# Patient Record
(demographics unavailable — no encounter records)

---

## 2025-02-28 NOTE — HEALTH RISK ASSESSMENT
[Yes] : Yes [No] : In the past 12 months have you used drugs other than those required for medical reasons? No [No falls in past year] : Patient reported no falls in the past year [0] : 2) Feeling down, depressed, or hopeless: Not at all (0) [de-identified] : no [de-identified] : no [de-identified] : walking [de-identified] : okay [de-identified] : no

## 2025-02-28 NOTE — HISTORY OF PRESENT ILLNESS
[FreeTextEntry1] : HTN [de-identified] : The patient is a 68 year old F who presents to the office for routine follow up of HTN BP has been stable She denies adverse effects of losartan. However, she notes myalgias from Crestor. Crestor started in 2023 for

## 2025-02-28 NOTE — PLAN
[FreeTextEntry1] : The patient is a 68 year old F See detailed assessment above Labs drawn in office. Appropriate medication renewal(s) provided. Provided scripts for necessary imaging and/or referrals. Further management to be completed pending lab results and/or imaging studies. All of the patient's questions and concerns were answered in detail.

## 2025-04-18 NOTE — DISCUSSION/SUMMARY
[FreeTextEntry1] : EKG:NSR reviewed labs HDL=47mg%_ nuts/exercise for low HDL- continue losartan for HPTN reduce caffeine/salt